# Patient Record
Sex: MALE
[De-identification: names, ages, dates, MRNs, and addresses within clinical notes are randomized per-mention and may not be internally consistent; named-entity substitution may affect disease eponyms.]

---

## 2019-07-23 PROBLEM — Z00.00 ENCOUNTER FOR PREVENTIVE HEALTH EXAMINATION: Status: ACTIVE | Noted: 2019-07-23

## 2019-07-24 ENCOUNTER — APPOINTMENT (OUTPATIENT)
Dept: ORTHOPEDIC SURGERY | Facility: CLINIC | Age: 64
End: 2019-07-24
Payer: COMMERCIAL

## 2019-07-24 VITALS — BODY MASS INDEX: 24.16 KG/M2 | HEIGHT: 65 IN | RESPIRATION RATE: 16 BRPM | WEIGHT: 145 LBS

## 2019-07-24 DIAGNOSIS — M25.561 PAIN IN RIGHT KNEE: ICD-10-CM

## 2019-07-24 DIAGNOSIS — G58.9 MONONEUROPATHY, UNSPECIFIED: ICD-10-CM

## 2019-07-24 PROCEDURE — 73562 X-RAY EXAM OF KNEE 3: CPT | Mod: RT

## 2019-07-24 PROCEDURE — 99204 OFFICE O/P NEW MOD 45 MIN: CPT

## 2019-07-24 NOTE — PHYSICAL EXAM
[de-identified] : Patient's right knee has definite medial joint line tenderness and warmth about the medial aspect of the knee quad strength is good. There's no effusion positive Flory test today. Range of motion 0-125°. [de-identified] : AP standing individual laterals and sunrise views were obtained showing well preserved joint spaces in all 3 compartments of both knees

## 2019-07-24 NOTE — DISCUSSION/SUMMARY
[de-identified] : Patient was placed on Naprosyn 500 mg twice a day combined with Pepcid. He'll call is in a week if not improved we will order MRI of the knee if not improved that point.

## 2019-07-24 NOTE — HISTORY OF PRESENT ILLNESS
[de-identified] : 62 y/o M presents for an evaluation of right knee pain that has been ongoing for the past 2 years w/o any specific injuries. 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 He adds that about a weeks ago he was running for the train and felt a clicking sensation in the right knee. Pt adds that pain increases on the anterior aspect of the right knee when he is in a squatting position and with a lot of ambulation. He also states that he lifts weight at the gym about 2-3 times a week.

## 2020-08-20 PROBLEM — G58.9 PINCHED NERVE IN NECK: Status: RESOLVED | Noted: 2019-07-24 | Resolved: 2020-08-20
